# Patient Record
Sex: FEMALE | Race: OTHER | NOT HISPANIC OR LATINO | URBAN - METROPOLITAN AREA
[De-identification: names, ages, dates, MRNs, and addresses within clinical notes are randomized per-mention and may not be internally consistent; named-entity substitution may affect disease eponyms.]

---
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
## 2024-11-22 NOTE — HISTORY OF PRESENT ILLNESS
Noted on previous labs. Will check HgbA1c to eval for DM2.   [Is permitted and is able to make independent decisions] : Is permitted and is able to make independent decisions [Normal Behavior/Attention] : normal behavior/attention [Cigarette smoke exposure] : Cigarette smoke exposure [With Teen] : teen [Goes to dentist yearly] : Patient goes to dentist yearly [LMP: _____] : LMP: [unfilled] [Cycle Length: _____ days] : Cycle Length: [unfilled] days [Menstrual products used per day: _____] : Menstrual products used per day: [unfilled] [Age of Menarche: ____] : Age of Menarche: [unfilled] [Painful Cramps] : painful cramps [Eats meals with family] : eats meals with family [Has family members/adults to turn to for help] : has family members/adults to turn to for help [Grade: ____] : Grade: [unfilled] [Normal Performance] : normal performance [Normal Homework] : normal homework [Eats regular meals including adequate fruits and vegetables] : eats regular meals including adequate fruits and vegetables [Drinks non-sweetened liquids] : drinks non-sweetened liquids  [Has friends] : has friends [At least 1 hour of physical activity a day] : at least 1 hour of physical activity a day [Exposure to electronic nicotine delivery system] : no exposure to electronic nicotine delivery system [Exposure to tobacco] : no exposure to tobacco [Uses safety belts/safety equipment] : uses safety belts/safety equipment  [Has ways to cope with stress] : does not have ways to cope with stress [Displays self-confidence] : displays self-confidence [FreeTextEntry8] : Uses pads [de-identified] : Lives at home with mom, step dad and brother.  [de-identified] : N [de-identified] : Eats fruits and vegetables, but at times eats fast food and soda.  [Irregular menses] : no irregular menses [Heavy Bleeding] : no heavy bleeding [Acne] : no acne [Tampon Use] : no tampon use [Sleep Concerns] : no sleep concerns [Has concerns about body or appearance] : does not have concerns about body or appearance [Uses electronic nicotine delivery system] : does not use electronic nicotine delivery system [Uses tobacco] : does not use tobacco [Uses drugs] : does not use drugs  [Exposure to drugs] : no exposure to drugs [Drinks alcohol] : does not drink alcohol [Exposure to alcohol] : no exposure to alcohol [Impaired/distracted driving] : no impaired/distracted driving [Has had sexual intercourse] : Patient has not had sexual intercourse [Has problems with sleep] : does not have problems with sleep [Gets depressed, anxious, or irritable/has mood swings] : does not get depressed, anxious, or irritable/has mood swings [Has thought about hurting self or considered suicide] : has not thought about hurting self or considered suicide [FreeTextEntry7] : No ER visits, no hospitalizations. Has been in good health  [de-identified] : No acute concerns or questions.  [de-identified] : Last appt was this past year.  [de-identified] : - unclear, no vaccination record.  [FreeTextEntry1] : Temitope Rios is a 13 year old F who is presenting for her annual physical. Overall, doing well with no acute concerns. Eats a varied diet, with fruits, veggies and at times fast food and drinks soda. Plays softball. Not sexually active, does not drink or do drugs. \par \par LMP: 3/11/19.